# Patient Record
Sex: FEMALE | Race: WHITE | NOT HISPANIC OR LATINO | URBAN - METROPOLITAN AREA
[De-identification: names, ages, dates, MRNs, and addresses within clinical notes are randomized per-mention and may not be internally consistent; named-entity substitution may affect disease eponyms.]

---

## 2024-05-26 ENCOUNTER — EMERGENCY (EMERGENCY)
Facility: HOSPITAL | Age: 29
LOS: 1 days | Discharge: HOME | End: 2024-05-26
Attending: EMERGENCY MEDICINE | Admitting: EMERGENCY MEDICINE
Payer: COMMERCIAL

## 2024-05-26 ENCOUNTER — EMERGENCY (EMERGENCY)
Facility: HOSPITAL | Age: 29
LOS: 1 days | Discharge: ANOTHER HOSPITAL | End: 2024-05-26
Attending: EMERGENCY MEDICINE | Admitting: EMERGENCY MEDICINE
Payer: COMMERCIAL

## 2024-05-26 VITALS
OXYGEN SATURATION: 98 % | SYSTOLIC BLOOD PRESSURE: 127 MMHG | DIASTOLIC BLOOD PRESSURE: 78 MMHG | TEMPERATURE: 98 F | HEART RATE: 81 BPM | RESPIRATION RATE: 18 BRPM

## 2024-05-26 VITALS
TEMPERATURE: 98 F | HEART RATE: 85 BPM | HEIGHT: 65 IN | OXYGEN SATURATION: 100 % | RESPIRATION RATE: 16 BRPM | DIASTOLIC BLOOD PRESSURE: 70 MMHG | SYSTOLIC BLOOD PRESSURE: 115 MMHG | WEIGHT: 289.91 LBS

## 2024-05-26 VITALS
TEMPERATURE: 98 F | WEIGHT: 289.91 LBS | SYSTOLIC BLOOD PRESSURE: 110 MMHG | DIASTOLIC BLOOD PRESSURE: 68 MMHG | RESPIRATION RATE: 18 BRPM | OXYGEN SATURATION: 100 % | HEIGHT: 65 IN | HEART RATE: 78 BPM

## 2024-05-26 LAB
ALBUMIN SERPL ELPH-MCNC: 3.4 G/DL — SIGNIFICANT CHANGE UP (ref 3.4–5)
ALP SERPL-CCNC: 77 U/L — SIGNIFICANT CHANGE UP (ref 40–120)
ALT FLD-CCNC: 41 U/L — SIGNIFICANT CHANGE UP (ref 12–42)
ANION GAP SERPL CALC-SCNC: 16 MMOL/L — SIGNIFICANT CHANGE UP (ref 9–16)
AST SERPL-CCNC: 21 U/L — SIGNIFICANT CHANGE UP (ref 15–37)
BASOPHILS # BLD AUTO: 0.04 K/UL — SIGNIFICANT CHANGE UP (ref 0–0.2)
BASOPHILS NFR BLD AUTO: 0.3 % — SIGNIFICANT CHANGE UP (ref 0–2)
BILIRUB DIRECT SERPL-MCNC: 0.1 MG/DL — SIGNIFICANT CHANGE UP (ref 0–0.3)
BILIRUB INDIRECT FLD-MCNC: 0.1 MG/DL — LOW (ref 0.2–1)
BILIRUB SERPL-MCNC: 0.2 MG/DL — SIGNIFICANT CHANGE UP (ref 0.2–1.2)
BUN SERPL-MCNC: 13 MG/DL — SIGNIFICANT CHANGE UP (ref 7–23)
CALCIUM SERPL-MCNC: 9 MG/DL — SIGNIFICANT CHANGE UP (ref 8.5–10.5)
CHLORIDE SERPL-SCNC: 106 MMOL/L — SIGNIFICANT CHANGE UP (ref 96–108)
CO2 SERPL-SCNC: 20 MMOL/L — LOW (ref 22–31)
CREAT SERPL-MCNC: 1.11 MG/DL — SIGNIFICANT CHANGE UP (ref 0.5–1.3)
EGFR: 69 ML/MIN/1.73M2 — SIGNIFICANT CHANGE UP
EOSINOPHIL # BLD AUTO: 0 K/UL — SIGNIFICANT CHANGE UP (ref 0–0.5)
EOSINOPHIL NFR BLD AUTO: 0 % — SIGNIFICANT CHANGE UP (ref 0–6)
GLUCOSE SERPL-MCNC: 111 MG/DL — HIGH (ref 70–99)
HCG SERPL-ACNC: <1 MIU/ML — SIGNIFICANT CHANGE UP
HCT VFR BLD CALC: 37.4 % — SIGNIFICANT CHANGE UP (ref 34.5–45)
HGB BLD-MCNC: 12.1 G/DL — SIGNIFICANT CHANGE UP (ref 11.5–15.5)
IMM GRANULOCYTES NFR BLD AUTO: 0.2 % — SIGNIFICANT CHANGE UP (ref 0–0.9)
LIDOCAIN IGE QN: 23 U/L — SIGNIFICANT CHANGE UP (ref 16–77)
LYMPHOCYTES # BLD AUTO: 24.4 % — SIGNIFICANT CHANGE UP (ref 13–44)
LYMPHOCYTES # BLD AUTO: 3.1 K/UL — SIGNIFICANT CHANGE UP (ref 1–3.3)
MCHC RBC-ENTMCNC: 26 PG — LOW (ref 27–34)
MCHC RBC-ENTMCNC: 32.4 GM/DL — SIGNIFICANT CHANGE UP (ref 32–36)
MCV RBC AUTO: 80.4 FL — SIGNIFICANT CHANGE UP (ref 80–100)
MONOCYTES # BLD AUTO: 1.38 K/UL — HIGH (ref 0–0.9)
MONOCYTES NFR BLD AUTO: 10.8 % — SIGNIFICANT CHANGE UP (ref 2–14)
NEUTROPHILS # BLD AUTO: 8.18 K/UL — HIGH (ref 1.8–7.4)
NEUTROPHILS NFR BLD AUTO: 64.3 % — SIGNIFICANT CHANGE UP (ref 43–77)
NRBC # BLD: 0 /100 WBCS — SIGNIFICANT CHANGE UP (ref 0–0)
PLATELET # BLD AUTO: 346 K/UL — SIGNIFICANT CHANGE UP (ref 150–400)
POTASSIUM SERPL-MCNC: 3.3 MMOL/L — LOW (ref 3.5–5.3)
POTASSIUM SERPL-SCNC: 3.3 MMOL/L — LOW (ref 3.5–5.3)
PROT SERPL-MCNC: 7.9 G/DL — SIGNIFICANT CHANGE UP (ref 6.4–8.2)
RBC # BLD: 4.65 M/UL — SIGNIFICANT CHANGE UP (ref 3.8–5.2)
RBC # FLD: 14.6 % — HIGH (ref 10.3–14.5)
SODIUM SERPL-SCNC: 142 MMOL/L — SIGNIFICANT CHANGE UP (ref 132–145)
WBC # BLD: 12.72 K/UL — HIGH (ref 3.8–10.5)
WBC # FLD AUTO: 12.72 K/UL — HIGH (ref 3.8–10.5)

## 2024-05-26 PROCEDURE — 99285 EMERGENCY DEPT VISIT HI MDM: CPT

## 2024-05-26 RX ORDER — KETOROLAC TROMETHAMINE 30 MG/ML
15 SYRINGE (ML) INJECTION ONCE
Refills: 0 | Status: DISCONTINUED | OUTPATIENT
Start: 2024-05-26 | End: 2024-05-26

## 2024-05-26 RX ORDER — MORPHINE SULFATE 50 MG/1
4 CAPSULE, EXTENDED RELEASE ORAL ONCE
Refills: 0 | Status: DISCONTINUED | OUTPATIENT
Start: 2024-05-26 | End: 2024-05-26

## 2024-05-26 RX ADMIN — Medication 15 MILLIGRAM(S): at 23:29

## 2024-05-26 RX ADMIN — Medication 15 MILLIGRAM(S): at 21:44

## 2024-05-26 RX ADMIN — MORPHINE SULFATE 4 MILLIGRAM(S): 50 CAPSULE, EXTENDED RELEASE ORAL at 21:44

## 2024-05-26 NOTE — ED ADULT TRIAGE NOTE - NS ED NURSE AMBULANCES
Lvm to see if had recent eye exam and if so where and when. [phone]   [facility] PAST SURGICAL HISTORY:  Endometrial polyp     H/O endoscopy     Patient undergoing in vitro fertilization

## 2024-05-26 NOTE — ED ADULT NURSE NOTE - OBJECTIVE STATEMENT
29 y/o female denies significant pmhx. Presents to the ED transfer from [facility] c/o LLQ abd pain since 1930. Endorses 1x episode nonbloody emesis. Denies CP, SOB, HA, F/C, N/V/D, weakness, dizziness, and any other complaints at this time. On exam A&Ox4, RA, ambulatory. Speaking in clear coherent sentences, respirations are spontaneous and unlabored. No obvious signs of injury. Abd appears distended. LLQ firm compared to other quadrants, which are soft. Transferred for US.

## 2024-05-26 NOTE — ED ADULT NURSE NOTE - CHIEF COMPLAINT QUOTE
Dutasteride Counseling: Dustasteride Counseling:  I discussed with the patient the risks of use of dutasteride including but not limited to decreased libido, decreased ejaculate volume, and gynecomastia. Women who can become pregnant should not handle medication.  All of the patient's questions and concerns were addressed. Dutasteride Male Counseling: Dustasteride Counseling:  I discussed with the patient the risks of use of dutasteride including but not limited to decreased libido, decreased ejaculate volume, and gynecomastia. Women who can become pregnant should not handle medication.  All of the patient's questions and concerns were addressed. Pt. walk in c/o LLQ pain radiating to L. flank x 1 hr with n/v. No PMH.

## 2024-05-26 NOTE — ED PROVIDER NOTE - OBJECTIVE STATEMENT
27 yo f no sig pmhx here with acute onset of L lower pelvic and abd pain with n/v ongoing for 1 hr in duration acute onset non radiating, pt has no vaginal bleeding or discharge. She has neve been sexually active. no h/o STI's.    I have reviewed available current nursing and previous documentation of past medical, surgical, family, and/or social history.

## 2024-05-26 NOTE — ED PROVIDER NOTE - PHYSICAL EXAMINATION
Physical Exam    Vital Signs: I have reviewed the initial vital signs.  Constitutional: well-appearing, appears stated age, advanced BMI  Eyes: PERRLA, EOM intact, RAPD absent, and symmetrical lids.  ENT: Neck supple with no adenopathy, moist MM.  Cardiovascular: regular rate, regular rhythm, well-perfused extremities  Respiratory: unlabored respiratory effort, clear to auscultation bilaterally  Gastrointestinal: soft, +LLQ loer pelvic ttp, no guarding, no rebound, non distended  Musculoskeletal: supple neck, no lower extremity edema  Integumentary: warm, dry, no rash  Neurologic: awake, alert, extremities’ motor and sensory functions grossly intact  Psychiatric: A&Ox3, appropriate mood, appropriate affect no IVL

## 2024-05-26 NOTE — ED ADULT NURSE NOTE - NSFALLRISK_ED_ALL_ED
No Other (Free Text): No suspicious lesions noted on skin examination Note Text (......Xxx Chief Complaint.): This diagnosis correlates with the Detail Level: Simple

## 2024-05-26 NOTE — ED PROVIDER NOTE - CLINICAL SUMMARY MEDICAL DECISION MAKING FREE TEXT BOX
29 yo f no sig pmhx here with acute onset of L lower pelvic and abd pain with n/v ongoing for 1 hr in duration acute onset non radiating, pt has no vaginal bleeding or discharge. She has neve been sexually active. no h/o STI's. +LLQ abd pain, no guarding, no rebound. plan: cbc, cmp, lipase, hcg, ua, transfer ED to ED for US

## 2024-05-27 VITALS
RESPIRATION RATE: 18 BRPM | SYSTOLIC BLOOD PRESSURE: 118 MMHG | OXYGEN SATURATION: 97 % | DIASTOLIC BLOOD PRESSURE: 70 MMHG | HEART RATE: 87 BPM | TEMPERATURE: 98 F

## 2024-05-27 LAB
APPEARANCE UR: CLEAR — SIGNIFICANT CHANGE UP
BACTERIA # UR AUTO: ABNORMAL /HPF
BILIRUB UR-MCNC: NEGATIVE — SIGNIFICANT CHANGE UP
CAST: 1 /LPF — SIGNIFICANT CHANGE UP (ref 0–4)
COLOR SPEC: YELLOW — SIGNIFICANT CHANGE UP
DIFF PNL FLD: ABNORMAL
GLUCOSE UR QL: NEGATIVE MG/DL — SIGNIFICANT CHANGE UP
KETONES UR-MCNC: ABNORMAL MG/DL
LEUKOCYTE ESTERASE UR-ACNC: NEGATIVE — SIGNIFICANT CHANGE UP
NITRITE UR-MCNC: NEGATIVE — SIGNIFICANT CHANGE UP
PH UR: 6 — SIGNIFICANT CHANGE UP (ref 5–8)
PROT UR-MCNC: NEGATIVE MG/DL — SIGNIFICANT CHANGE UP
RBC CASTS # UR COMP ASSIST: 78 /HPF — HIGH (ref 0–4)
SP GR SPEC: >1.03 — HIGH (ref 1–1.03)
SQUAMOUS # UR AUTO: 4 /HPF — SIGNIFICANT CHANGE UP (ref 0–5)
UROBILINOGEN FLD QL: 0.2 MG/DL — SIGNIFICANT CHANGE UP (ref 0.2–1)
WBC UR QL: 6 /HPF — HIGH (ref 0–5)

## 2024-05-27 PROCEDURE — 87086 URINE CULTURE/COLONY COUNT: CPT

## 2024-05-27 PROCEDURE — 74177 CT ABD & PELVIS W/CONTRAST: CPT | Mod: MC

## 2024-05-27 PROCEDURE — 81001 URINALYSIS AUTO W/SCOPE: CPT

## 2024-05-27 PROCEDURE — 76856 US EXAM PELVIC COMPLETE: CPT | Mod: 26

## 2024-05-27 PROCEDURE — 76856 US EXAM PELVIC COMPLETE: CPT

## 2024-05-27 PROCEDURE — 96374 THER/PROPH/DIAG INJ IV PUSH: CPT | Mod: XU

## 2024-05-27 PROCEDURE — 96375 TX/PRO/DX INJ NEW DRUG ADDON: CPT

## 2024-05-27 PROCEDURE — 99284 EMERGENCY DEPT VISIT MOD MDM: CPT | Mod: 25

## 2024-05-27 PROCEDURE — 74177 CT ABD & PELVIS W/CONTRAST: CPT | Mod: 26,MC

## 2024-05-27 RX ORDER — ONDANSETRON 8 MG/1
4 TABLET, FILM COATED ORAL ONCE
Refills: 0 | Status: COMPLETED | OUTPATIENT
Start: 2024-05-27 | End: 2024-05-27

## 2024-05-27 RX ORDER — SODIUM CHLORIDE 9 MG/ML
1000 INJECTION INTRAMUSCULAR; INTRAVENOUS; SUBCUTANEOUS ONCE
Refills: 0 | Status: COMPLETED | OUTPATIENT
Start: 2024-05-27 | End: 2024-05-27

## 2024-05-27 RX ORDER — IBUPROFEN 200 MG
1 TABLET ORAL
Qty: 12 | Refills: 0
Start: 2024-05-27 | End: 2024-05-30

## 2024-05-27 RX ORDER — MORPHINE SULFATE 50 MG/1
2 CAPSULE, EXTENDED RELEASE ORAL ONCE
Refills: 0 | Status: DISCONTINUED | OUTPATIENT
Start: 2024-05-27 | End: 2024-05-27

## 2024-05-27 RX ORDER — KETOROLAC TROMETHAMINE 30 MG/ML
15 SYRINGE (ML) INJECTION ONCE
Refills: 0 | Status: DISCONTINUED | OUTPATIENT
Start: 2024-05-27 | End: 2024-05-27

## 2024-05-27 RX ORDER — TAMSULOSIN HYDROCHLORIDE 0.4 MG/1
0.4 CAPSULE ORAL ONCE
Refills: 0 | Status: COMPLETED | OUTPATIENT
Start: 2024-05-27 | End: 2024-05-27

## 2024-05-27 RX ORDER — TAMSULOSIN HYDROCHLORIDE 0.4 MG/1
1 CAPSULE ORAL
Qty: 5 | Refills: 0
Start: 2024-05-27 | End: 2024-05-31

## 2024-05-27 RX ORDER — OXYCODONE AND ACETAMINOPHEN 5; 325 MG/1; MG/1
1 TABLET ORAL
Qty: 6 | Refills: 0
Start: 2024-05-27 | End: 2024-05-29

## 2024-05-27 RX ADMIN — MORPHINE SULFATE 2 MILLIGRAM(S): 50 CAPSULE, EXTENDED RELEASE ORAL at 05:49

## 2024-05-27 RX ADMIN — TAMSULOSIN HYDROCHLORIDE 0.4 MILLIGRAM(S): 0.4 CAPSULE ORAL at 04:21

## 2024-05-27 RX ADMIN — SODIUM CHLORIDE 1000 MILLILITER(S): 9 INJECTION INTRAMUSCULAR; INTRAVENOUS; SUBCUTANEOUS at 04:22

## 2024-05-27 RX ADMIN — MORPHINE SULFATE 2 MILLIGRAM(S): 50 CAPSULE, EXTENDED RELEASE ORAL at 06:06

## 2024-05-27 NOTE — ED PROVIDER NOTE - PROGRESS NOTE DETAILS
Patient found to be C. difficile positive surgery was consulted CT showing enterocolitis/enteritis no toxic megacolon IV Flagyl ordered

## 2024-05-27 NOTE — ED PROVIDER NOTE - OBJECTIVE STATEMENT
28-year-old female past medical history of obesity here today with left lower quadrant abdominal pain transferred from [facility] for ultrasound.  Patient states that onset was at 6 PM initially left lower quadrant radiating to left flank without associated urinary symptoms.  Has not had this pain before  denies history of kidney stones.  No associated chest pain or shortness of breath.  No fever chills cough or diarrhea.  No vomiting.  No nausea.

## 2024-05-27 NOTE — ED PROVIDER NOTE - PATIENT PORTAL LINK FT
You can access the FollowMyHealth Patient Portal offered by [facility] by registering at the following website: [email or web address]. By joining [employer]’s FollowMyHealth portal, you will also be able to view your health information using other applications (apps) compatible with our system. You can access the FollowMyHealth Patient Portal offered by [facility] by registering at the following website: [email or web address]. By joining [employer]’s FollowMyHealth portal, you will also be able to view your health information using other applications (apps) compatible with our system.

## 2024-05-27 NOTE — ED PROVIDER NOTE - PHYSICAL EXAMINATION
VITAL SIGNS: I have reviewed nursing notes and confirm.  CONSTITUTIONAL: Well appearing, in no acute distress.   SKIN:  warm and dry, no acute rash.   HEAD:  normocephalic, atraumatic.  EYES: EOM intact; conjunctiva and sclera clear.  ENT: No nasal discharge; airway clear.   NECK: Supple.  CARD: S1, S2, Regular rate and rhythm.   RESP:  Clear to auscultation b/l, no wheezes, rales or rhonchi.  ABD: Mild left lower quadrant tenderness without rebound or guarding  EXT: Normal ROM. No peripheral edema. Pulses intact and equal b/l.  NEURO: Alert, oriented, grossly unremarkable  PSYCH: Cooperative, mood and affect appropriate.

## 2024-05-27 NOTE — ED PROVIDER NOTE - NSFOLLOWUPINSTRUCTIONS_ED_ALL_ED_FT
Please follow-up closely with urology should you have any worsening pain vomiting or difficulty urinating and fever seek emergent medical attention.  Today you were evaluated for left lower quadrant abdominal pain you are found to have a 3.5 mm calculus at the distal right ureter.  You had normal renal function as well as a negative urinalysis without evidence of infection.  Please take Flomax before bed nightly as well as Motrin as needed for pain.  If you have increasing pain despite taking Motrin you can take a Percocet.  Do not drink alcohol.  If your pain is uncontrolled seek emergent medical attention.    CTAP w/ IV   IMPRESSION:  Mild left renal hydroureter with periureteral stranding secondary to a   3.5 mm calculus at the distal right ureter. No left renal hydronephrosis.    Ureteral Stones    WHAT YOU NEED TO KNOW:    A ureteral stone forms in the kidney and moves down the ureter and gets stuck there. The ureter is the tube that takes urine from the kidney to the bladder. Stones can form in the urinary system when your urine has high levels of minerals and salts. Urinary stones can be made of uric acid, calcium, phosphate, or oxalate crystals.  Kidney, Ureters, Bladder    WHILE YOU ARE HERE:    Informed consent is a legal document that explains the tests, treatments, or procedures that you may need. Informed consent means you understand what will be done and can make decisions about what you want. You give your permission when you sign the consent form. You can have someone sign this form for you if you are not able to sign it. You have the right to understand your medical care in words you know. Before you sign the consent form, understand the risks and benefits of what will be done. Make sure all your questions are answered.    An IV is a small tube placed in your vein that is used to give you medicine or liquids.    Medicine:    Alpha blockers may be given to help your ureteral stones pass.    Antinausea medicine calms your stomach and prevents vomiting.    NSAIDs help decrease swelling and pain.    Prescription pain medicine may be given to decrease pain. Do not wait until the pain is severe before you ask for more medicine.  Tests:    Urine tests may show if you have blood in your urine. They may also show high amounts of the substances that form stones, such as uric acid.    Blood tests show how well your kidneys are working. They may also be used to check the levels of calcium or uric acid in your blood.    An x-ray or CT scan of your kidneys, ureters, and bladder may be done. You may be given contrast liquid to help these show up better in the pictures. You may need to have more than one x-ray. Tell the healthcare provider if you have ever had an allergic reaction to contrast liquid.  Treatment: You may need any of the following if your ureteral stones are too large to pass or do not pass on their own:    Lithotripsy is a procedure that uses shock waves to break up the stones. Pieces of the stones can then pass in your urine.    Ureteroscopic kidney stone removal is a procedure to remove your ureteral stone.    Surgery may be needed to remove your ureteral stone if other procedures do not work.  RISKS:    Ureteral stones can cause an infection. Ureteral stones can also block the flow of urine and lead to kidney damage or failure. Once you have had urinary stones, you are at higher risk of getting them again.    CARE AGREEMENT:    You have the right to help plan your care. Learn about your health condition and how it may be treated. Discuss treatment options with your healthcare providers to decide what care you want to receive. You always have the right to refuse treatment.    © [employer] 1973, 2024    	  back to top

## 2024-05-27 NOTE — ED PROVIDER NOTE - CLINICAL SUMMARY MEDICAL DECISION MAKING FREE TEXT BOX
28-year-old female with left lower quadrant abdominal pain initially had workup at [facility] with elevated white blood cell count negative pregnancy test we will plan for ultrasound to rule out torsion consider advanced imaging Toradol and reassessment     differential includes ovarian torsion ovarian cyst renal colic diverticulitis   patient feeling improved after Toradol

## 2024-05-27 NOTE — ED PROVIDER NOTE - NSFOLLOWUPCLINICS_GEN_ALL_ED_FT
[facility] - Urology Clinic  Urology  [address] [place], [address]  [place], [place] [address]  Phone: [phone]  Fax:      [facility] - Urology [facility]  Urology  [address] [place], [address]  [place], [place] [address]  Phone: [phone]  Fax:      [phone] Detailed

## 2024-05-28 DIAGNOSIS — R10.32 LEFT LOWER QUADRANT PAIN: ICD-10-CM

## 2024-05-28 DIAGNOSIS — R10.2 PELVIC AND PERINEAL PAIN: ICD-10-CM

## 2024-05-28 DIAGNOSIS — Z88.1 ALLERGY STATUS TO OTHER ANTIBIOTIC AGENTS STATUS: ICD-10-CM

## 2024-05-28 DIAGNOSIS — N20.1 CALCULUS OF URETER: ICD-10-CM

## 2024-05-28 DIAGNOSIS — R11.2 NAUSEA WITH VOMITING, UNSPECIFIED: ICD-10-CM

## 2024-05-28 LAB
CULTURE RESULTS: SIGNIFICANT CHANGE UP
SPECIMEN SOURCE: SIGNIFICANT CHANGE UP